# Patient Record
Sex: FEMALE | URBAN - METROPOLITAN AREA
[De-identification: names, ages, dates, MRNs, and addresses within clinical notes are randomized per-mention and may not be internally consistent; named-entity substitution may affect disease eponyms.]

---

## 2017-01-02 ENCOUNTER — TRANSFERRED RECORDS (OUTPATIENT)
Dept: HEALTH INFORMATION MANAGEMENT | Facility: CLINIC | Age: 37
End: 2017-01-02

## 2017-01-02 ENCOUNTER — MEDICAL CORRESPONDENCE (OUTPATIENT)
Dept: HEALTH INFORMATION MANAGEMENT | Facility: CLINIC | Age: 37
End: 2017-01-02

## 2017-01-04 DIAGNOSIS — O26.90 PREGNANCY RELATED CONDITION, UNSPECIFIED TRIMESTER: Primary | ICD-10-CM

## 2017-01-04 LAB
C TRACH DNA SPEC QL PROBE+SIG AMP: NEGATIVE
N GONORRHOEA DNA SPEC QL PROBE+SIG AMP: NEGATIVE

## 2017-01-30 ENCOUNTER — PRE VISIT (OUTPATIENT)
Dept: MATERNAL FETAL MEDICINE | Facility: CLINIC | Age: 37
End: 2017-01-30

## 2017-02-01 ENCOUNTER — OFFICE VISIT (OUTPATIENT)
Dept: INTERPRETER SERVICES | Facility: CLINIC | Age: 37
End: 2017-02-01

## 2017-02-01 ENCOUNTER — HOSPITAL ENCOUNTER (OUTPATIENT)
Dept: ULTRASOUND IMAGING | Facility: CLINIC | Age: 37
Discharge: HOME | End: 2017-02-01
Attending: NURSE PRACTITIONER | Admitting: OBSTETRICS & GYNECOLOGY
Payer: MEDICAID

## 2017-02-01 ENCOUNTER — OFFICE VISIT (OUTPATIENT)
Dept: MATERNAL FETAL MEDICINE | Facility: CLINIC | Age: 37
End: 2017-02-01
Attending: NURSE PRACTITIONER
Payer: MEDICAID

## 2017-02-01 DIAGNOSIS — O09.521 AMA (ADVANCED MATERNAL AGE) MULTIGRAVIDA 35+, FIRST TRIMESTER: ICD-10-CM

## 2017-02-01 DIAGNOSIS — O09.521 AMA (ADVANCED MATERNAL AGE) MULTIGRAVIDA 35+, FIRST TRIMESTER: Primary | ICD-10-CM

## 2017-02-01 DIAGNOSIS — Z36.82 NUCHAL TRANSLUCENCY OF FETUS ON PRENATAL ULTRASOUND: ICD-10-CM

## 2017-02-01 DIAGNOSIS — O26.90 PREGNANCY RELATED CONDITION, UNSPECIFIED TRIMESTER: ICD-10-CM

## 2017-02-01 PROCEDURE — 76801 OB US < 14 WKS SINGLE FETUS: CPT

## 2017-02-01 PROCEDURE — T1013 SIGN LANG/ORAL INTERPRETER: HCPCS | Mod: U3,ZF

## 2017-02-01 PROCEDURE — 76813 OB US NUCHAL MEAS 1 GEST: CPT

## 2017-02-01 PROCEDURE — 96040 ZZH GENETIC COUNSELING, EACH 30 MINUTES: CPT | Mod: ZF | Performed by: GENETIC COUNSELOR, MS

## 2017-02-01 PROCEDURE — 40000791 ZZHCL STATISTIC VERIFI PRENATAL TRISOMY 21,18,13: Performed by: OBSTETRICS & GYNECOLOGY

## 2017-02-01 PROCEDURE — 36415 COLL VENOUS BLD VENIPUNCTURE: CPT | Performed by: OBSTETRICS & GYNECOLOGY

## 2017-02-01 NOTE — PROGRESS NOTES
[facility]  Genetic Counseling Consult    Patient: Aniya Rowe YOB: 1980   Date of Service: 17      Aniya Rowe was seen with her  Willian and  Hazel from [place] at [facility] for genetic consultation to discuss the options for screening and testing for fetal chromosome abnormalities.  The indication for genetic counseling is advanced maternal age and previous pregnancy with triploidy.       Impression/Plan:     Aniya silver pregnancy history is significant for triploidy.  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  CVS revealed 69,XXX karyotype.  We reviewed the low recurrence risk due to triploidy.    1.  Aniya had an ultrasound and blood draw for NIPT (Innatal test through [employer]).  Results are expected within 7-10 days, and will be available in [employer].  We will contact her to discuss the results, and a copy will be forwarded to the office of the referring OB provider.    2.  Maternal serum AFP (single marker screen) is recommended after 15 weeks to screen for open neural tube defects. A quad screen should not be performed.    3.  An 18-20 week comprehensive ultrasound is standard of care for all women 35 or older at delivery.    Pregnancy History:   /Parity:    Age at Delivery: 36 year old  GARETT: 2017, by Last Menstrual Period  Gestational Age: 13w1d    No significant complications or exposures were reported in the current pregnancy.    Aniya silver pregnancy history is significant for triploidy.  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  CVS revealed 69,XXX karyotype.  We reviewed the low recurrence risk due to triploidy.    Medical History:   Aniya silver reported medical history is not expected to impact pregnancy management or risks to fetal development.       Family History:   A three-generation pedigree was updated from her  previous genetic counseling session in 2016 by myself and is scanned under the  Media  tab.   No updates were noted.  Otherwise, the reported family history is negative for multiple miscarriages, stillbirths, birth defects, mental retardation, known genetic conditions, and consanguinity.       Carrier Screening:   Patient and  are [place].     Risk Assessment for Chromosome Conditions:   We explained that the risk for fetal chromosome abnormalities increases with maternal age. We discussed specific features of common chromosome abnormalities, including Down syndrome, trisomy 13, trisomy 18, and sex chromosome trisomies.      - At age 36 at delivery, the risk to have a baby with Down syndrome is 1 in 227.    - At age 36 at delivery, the risk to have a baby with any chromosome abnormality is 1 in 129.          Testing Options:   We discussed the following options:   Non-invasive Prenatal Testing (NIPT)    Maternal plasma cell-free DNA testing; first trimester ultrasound with nuchal translucency and nasal bone assessment is recommended, when appropriate    Screens for fetal trisomy 21, trisomy 13, trisomy 18, and sex chromosome aneuploidy    Cannot screen for open neural tube defects; maternal serum AFP after 15 weeks is recommended     Chorionic villus sampling (CVS)    Invasive procedure typically performed in the first trimester by which placental villi are obtained for the purpose of chromosome analysis and/or other prenatal genetic analysis    Diagnostic results; >99% sensitivity for fetal chromosome abnormalities    Cannot test for open neural tube defects; maternal serum AFP after 15 weeks is recommended     Genetic Amniocentesis    Invasive procedure typically performed in the second trimester by which amniotic fluid is obtained for the purpose of chromosome analysis and/or other prenatal genetic analysis    Diagnostic results; >99% sensitivity for fetal chromosome abnormalities    AFAFP measurement  tests for open neural tube defects     Comprehensive (Level II) ultrasound: Detailed ultrasound performed between 18-22 weeks gestation to screen for major birth defects and markers for aneuploidy.        We reviewed the benefits and limitations of this testing.  Screening tests provide a risk assessment specific to the pregnancy for certain fetal chromosome abnormalities, but cannot definitively diagnose or exclude a fetal chromosome abnormality.  Follow-up genetic counseling and consideration of diagnostic testing is recommended with any abnormal screening result.     Diagnostic tests carry inherent risks- including risk of miscarriage- that require careful consideration.  These tests can detect fetal chromosome abnormalities with greater than 99% certainty.  Results can be compromised by maternal cell contamination or mosaicism, and are limited by the resolution of cytogenetic G-banding technology.  There is no screening nor diagnostic test that can detect all forms of birth defects or mental disability.     It was a pleasure to be involved with Aniya [facility]. Face-to-face time of the meeting was 30 minutes.    Gwen Sims [facility] [place]  Certified Genetic Counselor  Pager: [phone]

## 2017-02-02 NOTE — PROGRESS NOTES
Please refer to ultrasound report under 'Imaging' Studies of 'Chart Review' tabs.    Jerry Garcia M.D.

## 2017-02-09 ENCOUNTER — TELEPHONE (OUTPATIENT)
Dept: MATERNAL FETAL MEDICINE | Facility: CLINIC | Age: 37
End: 2017-02-09

## 2017-02-09 LAB — LAB SCANNED RESULT: NORMAL

## 2017-02-09 NOTE — TELEPHONE ENCOUNTER
February 9, 2017. Contacted Aniya DAVIS Rowe on the phone with  with normal Progenity/Innatal free fetal DNA screening for Down syndrome and trisomies 13 and 18 (see scanned report from [facility]).  Sex chromosome were also assessed and consistent with XY - gender was disclosed.  Discussed high detection rates for fetal Down syndrome, trisomies 13 and 18, and fetal sex chromosome abnormalities; however, not 100%.  Aniya indicated her understanding.  Results available in epic for review    Plan:  Routine follow up with primary Ob/Gyn.    Gwen Sism MS, [facility] [place]  [phone].

## 2017-02-11 LAB
ABO + RH BLD: NORMAL
ABO + RH BLD: NORMAL
BLD GP AB SCN SERPL QL: NORMAL
HBV SURFACE AG SERPL QL IA: NONREACTIVE
HIV 1+2 AB+HIV1 P24 AG SERPL QL IA: NONREACTIVE
RUBELLA ANTIBODY IGG QUANTITATIVE: 25.6 IU/ML

## 2017-03-13 ENCOUNTER — HOSPITAL ENCOUNTER (OUTPATIENT)
Dept: ULTRASOUND IMAGING | Facility: CLINIC | Age: 37
Discharge: HOME | End: 2017-03-13
Attending: OBSTETRICS & GYNECOLOGY | Admitting: OBSTETRICS & GYNECOLOGY
Payer: MEDICAID

## 2017-03-13 ENCOUNTER — OFFICE VISIT (OUTPATIENT)
Dept: MATERNAL FETAL MEDICINE | Facility: CLINIC | Age: 37
End: 2017-03-13
Attending: OBSTETRICS & GYNECOLOGY
Payer: MEDICAID

## 2017-03-13 DIAGNOSIS — O09.522 AMA (ADVANCED MATERNAL AGE) MULTIGRAVIDA 35+, SECOND TRIMESTER: Primary | ICD-10-CM

## 2017-03-13 DIAGNOSIS — O09.521 AMA (ADVANCED MATERNAL AGE) MULTIGRAVIDA 35+, FIRST TRIMESTER: ICD-10-CM

## 2017-03-13 PROCEDURE — 76811 OB US DETAILED SNGL FETUS: CPT

## 2017-03-13 NOTE — MR AVS SNAPSHOT
"              After Visit Summary   3/13/2017    Aniya Rowe    MRN: [id number]           Patient Information     Date Of Birth          1980        Visit Information        Provider Department      3/13/2017 2:45 PM Leah Hines,  [facility]        Today's Diagnoses     AMA (advanced maternal age) multigravida 35+, second trimester    -  1       Follow-ups after your visit        Who to contact     If you have questions or need follow up information about today's clinic visit or your schedule please contact [facility] directly at [phone].  Normal or non-critical lab and imaging results will be communicated to you by [employer], letter or phone within 4 business days after the clinic has received the results. If you do not hear from us within 7 days, please contact the clinic through [employer] or phone. If you have a critical or abnormal lab result, we will notify you by phone as soon as possible.  Submit refill requests through [email or web address] or call your pharmacy and they will forward the refill request to us. Please allow 3 business days for your refill to be completed.          Additional Information About Your Visit        MyChart Information     [email or web address] lets you send messages to your doctor, view your test results, renew your prescriptions, schedule appointments and more. To sign up, go to [email or web address] . Click on \"Log in\" on the left side of the screen, which will take you to the Welcome page. Then click on \"Sign up Now\" on the right side of the page.     You will be asked to enter the access code listed below, as well as some personal information. Please follow the directions to create your username and password.     Your access code is: [email or web address]  Expires: 2017  3:52 PM     Your access code will  in 90 days. If you need help or a new code, please call your [place] clinic or [phone].        Care " EveryWhere ID     This is your Care EveryWhere ID. This could be used by other organizations to access your [place] medical records  [id number]        Your Vitals Were     Last Period                   11/01/2016            Blood Pressure from Last 3 Encounters:   No data found for BP    Weight from Last 3 Encounters:   No data found for Wt              Today, you had the following     No orders found for display       Primary Care Provider    None       No address on file        Thank you!     Thank you for choosing [facility]  for your care. Our goal is always to provide you with excellent care. Hearing back from our patients is one way we can continue to improve our services. Please take a few minutes to complete the written survey that you may receive in the mail after your visit with us. Thank you!             Your Updated Medication List - Protect others around you: Learn how to safely use, store and throw away your medicines at www.disposemymeds.org.      Notice  As of 3/13/2017  3:19 PM    You have not been prescribed any medications.

## 2017-03-13 NOTE — PROGRESS NOTES
"Please see \"Imaging\" tab under \"Chart Review\" for details of today's US.    Leah Hines, DO    "

## 2017-04-21 ENCOUNTER — HOSPITAL ENCOUNTER (OUTPATIENT)
Facility: CLINIC | Age: 37
Discharge: HOME | End: 2017-04-21
Attending: ADVANCED PRACTICE MIDWIFE | Admitting: ADVANCED PRACTICE MIDWIFE
Payer: COMMERCIAL

## 2017-04-21 VITALS
HEART RATE: 73 BPM | OXYGEN SATURATION: 100 % | TEMPERATURE: 98.7 F | DIASTOLIC BLOOD PRESSURE: 66 MMHG | SYSTOLIC BLOOD PRESSURE: 101 MMHG

## 2017-04-21 PROCEDURE — 99213 OFFICE O/P EST LOW 20 MIN: CPT

## 2017-04-21 RX ORDER — PRENATAL VIT/IRON FUM/FOLIC AC 27MG-0.8MG
1 TABLET ORAL DAILY
COMMUNITY

## 2017-04-21 RX ORDER — ONDANSETRON 2 MG/ML
4 INJECTION INTRAMUSCULAR; INTRAVENOUS EVERY 6 HOURS PRN
Status: DISCONTINUED | OUTPATIENT
Start: 2017-04-21 | End: 2017-04-21 | Stop reason: HOSPADM

## 2017-04-21 NOTE — IP AVS SNAPSHOT
MRN:[id number]                      After Visit Summary   4/21/2017    Aniya Rowe    MRN: [id number]           Thank you!     Thank you for choosing [facility] for your care. Our goal is always to provide you with excellent care. Hearing back from our patients is one way we can continue to improve our services. Please take a few minutes to complete the written survey that you may receive in the mail after you visit with us. Thank you!        Patient Information     Date Of Birth          1980        Designated Caregiver       Most Recent Value    Caregiver    Will someone help with your care after discharge? no      About your hospital stay     You were admitted on:  April 21, 2017 You last received care in the:  [facility]    You were discharged on:  April 21, 2017       Who to Call     For medical emergencies, please call 911.  For non-urgent questions about your medical care, please call your primary care provider or clinic, None          Attending Provider     Provider Specialty    Awa Witt APRN CNM [facility]       Primary Care Provider    None       No address on file        Pending Results     No orders found from 4/19/2017 to 4/22/2017.            Statement of Approval     Ordered          04/21/17 1946  I have reviewed and agree with all the recommendations and orders detailed in this document.  EFFECTIVE NOW     Approved and electronically signed by:  Awa Witt APRN CNM             Admission Information     Date & Time Provider Department Dept. Phone    4/21/2017 Awa Witt APRN CNM [facility] [phone]      Your Vitals Were     Blood Pressure Pulse Temperature Last Period Pulse Oximetry       101/66 73 98.7  F (37.1  C) (Oral) 11/01/2016 100%       MyChart Information     [employer] lets you send messages to your doctor, view your test results, renew your prescriptions, schedule appointments and more. To sign up, go to [email or web address] . Click on  "\"Log in\" on the left side of the screen, which will take you to the Welcome page. Then click on \"Sign up Now\" on the right side of the page.     You will be asked to enter the access code listed below, as well as some personal information. Please follow the directions to create your username and password.     Your access code is: [email or web address]  Expires: 2017  3:52 PM     Your access code will  in 90 days. If you need help or a new code, please call your [place] clinic or [phone].        Care EveryWhere ID     This is your Care EveryWhere ID. This could be used by other organizations to access your [place] medical records  [id number]           Review of your medicines      UNREVIEWED medicines. Ask your doctor about these medicines        Dose / Directions    prenatal multivitamin  plus iron 27-0.8 MG Tabs per tablet   Indication:  Pregnancy        Dose:  1 tablet   Take 1 tablet by mouth daily   Refills:  0                Protect others around you: Learn how to safely use, store and throw away your medicines at www.disposemymeds.org.             Medication List: This is a list of all your medications and when to take them. Check marks below indicate your daily home schedule. Keep this list as a reference.      Medications           Morning Afternoon Evening Bedtime As Needed    prenatal multivitamin  plus iron 27-0.8 MG Tabs per tablet   Take 1 tablet by mouth daily                                          More Information        Recuento de patadas  Es normal que le preocupe la sujata de anderson bebé. Para saber si el bebé está katherine, usted puede anotar las veces que usted siente bladimir pataditas en un registro de movimientos todos los días. Normalmente es posible sentir que el bebé se mueve a partir de la semana 20 del embarazo. No olvide llevar los registros de los movimientos del bebé a todas las citas que tenga con anderson proveedor de atención médica.     Cómo contar los movimientos    Escoja paul hora " cuando el bebé esté activo, heather por ejemplo después de paul comida.    Siéntese cómodamente o acuéstese de lado.    La primera vez que el bebé se mueva, anote la hora.    Cuente cada movimiento hasta que el bebé se haya movido 10 veces. ([place] puede llevar entre 20 minutos y 2 horas.)    Si el bebé no se ha movido 4 veces en 1 hora, dése paul palmadita en el abdomen para despertarlo.    Anote la hora en que sienta el décimo movimiento del bebé.    Trate de hacer esto a la misma hora todos los días.  Cuándo debe llamar al proveedor de atención médica  Llame a anderson proveedor de atención médica en el acto en cualquiera de los siguientes casos:     Si el bebé se mueve menos de 10 veces en 1 horas mientras usted está llevando la cuenta de las pataditas.    Si el bebé se mueve con mucha menos frecuencia que en días anteriores.    Si usted no ha sentido movimientos del bebé en todo el día.    [id number] [address] [address]. Todos los derechos reservados. Esta información no pretende sustituir la atención médica profesional. Sólo anderson médico puede diagnosticar y tratar un problema de sujata.            Falso trabajo de parto (False Labor)  Si anderson embarazo está en la semana 37 o más y usted tiene contracciones que no son las de un verdadero trabajo de parto, usted está teniendo un falso trabajo de parto. [place] significa que aún no es el momento de byron a colten a anderson bebé.    Las verdaderas contracciones del trabajo de parto pueden comenzar de forma irregular, laurie rápidamente se vuelven más seguidas y regulares, haciéndose más justin y prolongadas. Los intervalos entre las contracciones se van haciendo más cortos. Aun desde el comienzo, estas contracciones valladares al menos 30 segundos y aumentan hasta durar un minuto. El verdadero trabajo de parto suele comenzar en la parte de atrás y pasarse a la parte de adelante.  Las contracciones del falso trabajo de parto pueden ser justin, frecuentes y  dolorosas laurie no son regulares. La intensidad puede variar desde chace a leve y nuevamente a chace. Las contracciones del falso trabajo de parto suelen sentirse en la parte de adelante. Mientras que las verdaderas contracciones del trabajo de parto no desaparecen independientemente de lo que usted esté haciendo, el falso trabajo de parto puede interrumpirse espontáneamente o cuando usted descansa o se mueve de un lugar a otro.  El falso trabajo de parto puede hacer que usted se sienta ansiosa o pierda el sueño. Tener un falso trabajo de parto no significa que usted esté enferma o que le suceda algo ashleigh a anderson debra. No necesita brinda ningún medicamento para ello.  Algunas veces, puede ser difícil diferenciar un verdadero trabajo de parto a jameel falso. En tales casos, usted podría necesitar un examen vaginal, el cual le permite al proveedor de atención médica revisar si hay cambios en el brit uterino que solo ocurren en un trabajo de parto verdadero.  Cuidados en el hogar    Puede ayudar beber abundante agua y brinda un baño de agua tibia. Berenice lo que pueda con anticipación para prepararse para el momento de byron a colten, de manera que tenga menos preocupaciones más tarde.    Lleve un registro de bladimir contracciones. Anote la hora a la que empieza cada contracción y el tiempo que dura. Un cronómetro puede ser de utilidad. Observe el patrón que siguen  las contracciones espaciadas regularmente y el aumento gradual de tiempo que dura cada paul.    No se avergüence de ir al hospital con paul  falsa alarma . Considérelo heather paul buena práctica para cuando llegue el momento de verdad.  Cuidados de seguimiento  Berenice un seguimiento con anderson proveedor de atención médica o heather le indiquen. Si está preocupada, confundida, no puede comer o dormir, o tiene preguntas sobre anderson sujata o anderson embarazo, programe paul jasmeet con anderson proveedor.  Cuándo bucar consejo médico  Llame a anderson proveedor de atención médica si algo de lo siguiente  ocurre:    Si tiene menos de 37 semanas y comienza a tener contracciones nuevamente.    Las contracciones son regulares, se tornan más prolongadas, más justin y más seguidas.    Se rompe la oswaldo.    Tiene sangrado vaginal.    Siente paul disminución en los movimientos del bebé o cualquier otro cambio inusual.    No está le si está teniendo un trabajo de parto falso o verdadero.    [id number] [employer]. [address] [place] [address], [place] [address]. Todos los derechos reservados. Esta información no pretende sustituir la atención médica profesional. Sólo anderson médico puede diagnosticar y tratar un problema de sujata.

## 2017-04-21 NOTE — IP AVS SNAPSHOT
[facility]    [address]    [place] [address]    Phone:  [phone]                                       After Visit Summary   4/21/2017    Aniya Rowe    MRN: [id number]           After Visit Summary Signature Page     I have received my discharge instructions, and my questions have been answered. I have discussed any challenges I see with this plan with the nurse or doctor.    ..........................................................................................................................................  Patient/Patient Representative Signature      ..........................................................................................................................................  Patient Representative Print Name and Relationship to Patient    ..................................................               ................................................  Date                                            Time    ..........................................................................................................................................  Reviewed by Signature/Title    ...................................................              ..............................................  Date                                                            Time

## 2017-04-22 NOTE — PLAN OF CARE
Data: [facility] at 1834.   Reason for maternal/fetal assessment per patient is Abdominal Pain  . Patient is a . Prenatal record reviewed.      Obstetric History       T4      TAB1   SAB0   E0   M0   L4       # Outcome Date GA Lbr Lorenzo/2nd Weight Sex Delivery Anes PTL Lv   6 Current            5 TAB 16 12w4d    TAB      4 Term 04 40w0d   F    Y   3 Term 01 40w0d   M    Y   2 Term 10/23/98 40w0d   M    Y   1 Term 96 40w0d   M    Y         Medical History:   Past Medical History:   Diagnosis Date     Anemia    . Gestational Age 24w3d. VSS. Cervix: not examined.  Fetal movement present. Patient denies cramping, backache, vaginal discharge, pelvic pressure, UTI symptoms, GI problems, bloody show, vaginal bleeding, edema, headache, visual disturbances, epigastric or URQ pain, abdominal pain, rupture of membranes. Support persons Willian present.  Action: Verbal consent for EFM. Triage assessment completed. EFM applied upon arrival. Uterine assessment showed no contractions, none felt by patient. Fetal assessment: Presumed adequate fetal oxygenation documented (see flow record). Patient education pamphlets given on fetal movement counts and  labor. Patient instructed to report change in fetal movement, vaginal leaking of fluid or bleeding, abdominal pain, or any concerns related to the pregnancy to her nurse/physician.   Response: Awa Witt CNM informed of patients arrival. Plan per provider is to discharge home. Patient verbalized understanding of education and verbalized agreement with plan. Discharged ambulatory at .

## 2017-04-22 NOTE — PROGRESS NOTES
CHIEF COMPLAINT  ========================  Aniya Rowe is a 36 year old patient presenting today at 24w3d for evaluation of intermittant pelvic pressure when ambulating.     Patient's last menstrual period was 2016.  Estimated Date of Delivery: Aug 8, 2017     HPI  ==================   Patient reports that this week she has noticed increased lower abdominal pressure and occasional pain in the vagina with this pressure.  Denies contractions, states that pressure is relieved with rest.  Feels anxious about this pressure and concern for possible fetal loss due to her past complicated pregnancy.  She states that she is unable to finish her work with ease this week due to new onset of pelvic pressure.  She denies any change in vaginal discharge, denies urinary symptoms, denies N&V or change in bowel habits.    CONTRACTIONS: none  ABDOMINAL PAIN: pressure and modifying factors rest improves symptoms.    FETAL MOVEMENT: active  VAGINAL BLEEDING: none    REVIEW OF SYSTEMS  =====================  C: NEGATIVE for fever, chills  I: NEGATIVE for worrisome rashes, moles or lesions  E: NEGATIVE for vision changes or irritation  R: NEGATIVE for significant cough or SOB  CV: NEGATIVE for chest pain, palpitations or varicosities  GI: NEGATIVE for nausea, abdominal pain, heartburn, or change in bowel habits  : NEGATIVE for frequency, dysuria, or hematuria  M: NEGATIVE for significant arthralgias or myalgia  N: NEGATIVE for headache, weakness, dizziness or paresthesias  P: NEGATIVE for changes in mood or affect  HISTORIES  ==============  ALLERGIES:  No Known Allergies  PAST MEDICAL HISTORY  Past Medical History:   Diagnosis Date     Anemia      PARTNER: Willian at bedside  FAMILY HISTORY  No family history on file.  OB HISTORY  Prenatal record reviewed from care everywhere.    at term x4, MAB at 14 weeks with chromosomal abnormality.  EXAM  ============  Vital signs stable, afebrile and BP is WNL  GENERAL  APPEARANCE: healthy, alert and no distress  RESP: lungs clear to auscultation - no rales, rhonchi or wheezes  BREAST: normal without masses, tenderness or nipple discharge and no palpable axillary masses or adenopathy  CV: regular rates and rhythm, normal S1 S2, no S3 or S4 and no murmur,and no varicosities  ABDOMEN:  soft, nontender,non-tender between contractions no epigastric pain  NEURO: Denies headache, blurred vision  PSYCH: mentation appears normal. and affect normal/bright  LEGS: No edema  CONTRACTIONS: None per EFM or felt per pateitn.    FETAL HEART TONES:  140 with moderate long term variability, accelerations-yes, decels none.  NST: appropriate for gestational age    PELVIC EXAM: not indicated    LABS:  None    DIAGNOSIS  ============  24w3d, Pelvic pressure appropriate for gestational age and parity Reassuring fetal status  PLAN  ============  Discharge to home  Patient reports she has a support belt at home, has not been using it.  Encouraged patient to use it when working  Follow up in clinic this week  Discussed normal uterine/fetal growth at this gestation and what to expect.  Reviewed warning signs of PTL and when to return  Patient verbalized understanding agrees with plan to discharge to home.  RANI Brody CNM, CNM

## 2017-07-31 ENCOUNTER — HOSPITAL ENCOUNTER (OUTPATIENT)
Dept: ULTRASOUND IMAGING | Facility: CLINIC | Age: 37
Discharge: HOME | End: 2017-07-31
Attending: NURSE PRACTITIONER | Admitting: OBSTETRICS & GYNECOLOGY
Payer: COMMERCIAL

## 2017-07-31 ENCOUNTER — OFFICE VISIT (OUTPATIENT)
Dept: MATERNAL FETAL MEDICINE | Facility: CLINIC | Age: 37
End: 2017-07-31
Attending: NURSE PRACTITIONER
Payer: COMMERCIAL

## 2017-07-31 DIAGNOSIS — O28.8 AMNIOTIC FLUID INDEX BORDERLINE LOW: Primary | ICD-10-CM

## 2017-07-31 DIAGNOSIS — O26.90 PREGNANCY RELATED CONDITION, UNSPECIFIED TRIMESTER: ICD-10-CM

## 2017-07-31 PROCEDURE — 76816 OB US FOLLOW-UP PER FETUS: CPT

## 2017-07-31 NOTE — MR AVS SNAPSHOT
"              After Visit Summary   2017    Aniya Rowe    MRN: [id number]           Patient Information     Date Of Birth          1980        Visit Information        Provider Department      2017 10:45 AM Leah Hines, DO [facility] Maternal Fetal Medicine [facility]        Today's Diagnoses     Amniotic fluid index borderline low    -  1       Follow-ups after your visit        Who to contact     If you have questions or need follow up information about today's clinic visit or your schedule please contact [facility] MATERNAL FETAL MEDICINE [facility] directly at [phone].  Normal or non-critical lab and imaging results will be communicated to you by [employer], letter or phone within 4 business days after the clinic has received the results. If you do not hear from us within 7 days, please contact the clinic through Afflet or phone. If you have a critical or abnormal lab result, we will notify you by phone as soon as possible.  Submit refill requests through atHomestars or call your pharmacy and they will forward the refill request to us. Please allow 3 business days for your refill to be completed.          Additional Information About Your Visit        MyChart Information     atHomestars lets you send messages to your doctor, view your test results, renew your prescriptions, schedule appointments and more. To sign up, go to [email or web address] . Click on \"Log in\" on the left side of the screen, which will take you to the Welcome page. Then click on \"Sign up Now\" on the right side of the page.     You will be asked to enter the access code listed below, as well as some personal information. Please follow the directions to create your username and password.     Your access code is: [email or web address]  Expires: 10/29/2017 11:04 AM     Your access code will  in 90 days. If you need help or a new code, please call your [place] clinic or [phone].        Care EveryWhere ID     This is " your Care EveryWhere ID. This could be used by other organizations to access your Mobile medical records  [id number]        Your Vitals Were     Last Period                   11/01/2016            Blood Pressure from Last 3 Encounters:   04/21/17 101/66    Weight from Last 3 Encounters:   No data found for Wt              Today, you had the following     No orders found for display       Primary Care Provider    None       No address on file        Equal Access to Services     [facility]: Hadii aad ku hadasho Soomaali, waaxda luqadaha, qaybta kaalmada adeegyada, waxay staciain hayaan adeeg art lakevenjamar . So [facility] [place] [phone].    ATENCIÓN: Si habla español, tiene a anderson disposición servicios gratuitos de asistencia lingüística. Llame al [phone].    We comply with applicable federal civil rights laws and [place] laws. We do not discriminate on the basis of race, color, national origin, age, disability sex, sexual orientation or gender identity.            Thank you!     Thank you for choosing [facility]  for your care. Our goal is always to provide you with excellent care. Hearing back from our patients is one way we can continue to improve our services. Please take a few minutes to complete the written survey that you may receive in the mail after your visit with us. Thank you!             Your Updated Medication List - Protect others around you: Learn how to safely use, store and throw away your medicines at www.disposemymeds.org.          This list is accurate as of: 7/31/17 11:04 AM.  Always use your most recent med list.                   Brand Name Dispense Instructions for use Diagnosis    prenatal multivitamin  plus iron 27-0.8 MG Tabs per tablet      Take 1 tablet by mouth daily

## 2017-08-11 ENCOUNTER — HOSPITAL ENCOUNTER (OUTPATIENT)
Dept: ULTRASOUND IMAGING | Facility: CLINIC | Age: 37
Discharge: HOME | End: 2017-08-11
Attending: ADVANCED PRACTICE MIDWIFE | Admitting: OBSTETRICS & GYNECOLOGY
Payer: COMMERCIAL

## 2017-08-11 ENCOUNTER — OFFICE VISIT (OUTPATIENT)
Dept: MATERNAL FETAL MEDICINE | Facility: CLINIC | Age: 37
End: 2017-08-11
Attending: ADVANCED PRACTICE MIDWIFE
Payer: COMMERCIAL

## 2017-08-11 DIAGNOSIS — O26.90 PREGNANCY RELATED CONDITION, UNSPECIFIED TRIMESTER: ICD-10-CM

## 2017-08-11 DIAGNOSIS — O28.8 AMNIOTIC FLUID INDEX BORDERLINE LOW: Primary | ICD-10-CM

## 2017-08-11 PROCEDURE — 76819 FETAL BIOPHYS PROFIL W/O NST: CPT

## 2017-08-11 NOTE — MR AVS SNAPSHOT
"              After Visit Summary   8/11/2017    Aniya Rowe    MRN: [id number]           Patient Information     Date Of Birth          1980        Visit Information        Provider Department      8/11/2017 4:15 PM Laz Watson MD [facility] Maternal Fetal Medicine [facility]        Today's Diagnoses     Amniotic fluid index borderline low    -  1       Follow-ups after your visit        Future tests that were ordered for you today     Open Future Orders        Priority Expected Expires Ordered    MFM BPP Single ASAP  6/10/2018 8/10/2017            Who to contact     If you have questions or need follow up information about today's clinic visit or your schedule please contact [facility] MATERNAL FETAL MEDICINE [facility] directly at [phone].  Normal or non-critical lab and imaging results will be communicated to you by Managed by Qhart, letter or phone within 4 business days after the clinic has received the results. If you do not hear from us within 7 days, please contact the clinic through sonarDesignt or phone. If you have a critical or abnormal lab result, we will notify you by phone as soon as possible.  Submit refill requests through Pixc or call your pharmacy and they will forward the refill request to us. Please allow 3 business days for your refill to be completed.          Additional Information About Your Visit        Managed by Qhart Information     Pixc lets you send messages to your doctor, view your test results, renew your prescriptions, schedule appointments and more. To sign up, go to [email or web address] . Click on \"Log in\" on the left side of the screen, which will take you to the Welcome page. Then click on \"Sign up Now\" on the right side of the page.     You will be asked to enter the access code listed below, as well as some personal information. Please follow the directions to create your username and password.     Your access code is: [email or web address]  Expires: 10/29/2017 11:04 AM     Your " access code will  in 90 days. If you need help or a new code, please call your [place] clinic or [phone].        Care EveryWhere ID     This is your Care EveryWhere ID. This could be used by other organizations to access your [place] medical records  [id number]        Your Vitals Were     Last Period                   2016            Blood Pressure from Last 3 Encounters:   17 101/66    Weight from Last 3 Encounters:   No data found for Wt              Today, you had the following     No orders found for display       Primary Care Provider    None       No address on file        Equal Access to Services     [facility]: Hadii aad ku hadasho Soomaali, waaxda luqadaha, qaybta kaalmada adeegyamarcelo, adam stewart . So [facility] [phone].    ATENCIÓN: Si habla español, tiene a anderson disposición servicios gratuitos de asistencia lingüística. Llame al [phone].    We comply with applicable federal civil rights laws and [place] laws. We do not discriminate on the basis of race, color, national origin, age, disability sex, sexual orientation or gender identity.            Thank you!     Thank you for choosing [facility]  for your care. Our goal is always to provide you with excellent care. Hearing back from our patients is one way we can continue to improve our services. Please take a few minutes to complete the written survey that you may receive in the mail after your visit with us. Thank you!             Your Updated Medication List - Protect others around you: Learn how to safely use, store and throw away your medicines at www.disposemymeds.org.          This list is accurate as of: 17  5:55 PM.  Always use your most recent med list.                   Brand Name Dispense Instructions for use Diagnosis    prenatal multivitamin plus iron 27-0.8 MG Tabs per tablet      Take 1 tablet by mouth daily

## 2017-08-11 NOTE — PROGRESS NOTES
"Please see \"Imaging\" tab under \"Chart Review\" for details of today's US at the [facility].    Laz Watson MD  Maternal-Fetal Medicine      "

## 2017-08-12 ENCOUNTER — HOSPITAL ENCOUNTER (INPATIENT)
Facility: CLINIC | Age: 37
LOS: 2 days | Discharge: HOME | End: 2017-08-14
Attending: FAMILY MEDICINE | Admitting: FAMILY MEDICINE
Payer: COMMERCIAL

## 2017-08-12 PROBLEM — Z37.9 NORMAL LABOR: Status: ACTIVE | Noted: 2017-08-12

## 2017-08-12 LAB
ABO + RH BLD: NORMAL
ABO + RH BLD: NORMAL
SPECIMEN EXP DATE BLD: NORMAL
T PALLIDUM IGG+IGM SER QL: NEGATIVE

## 2017-08-12 PROCEDURE — 99215 OFFICE O/P EST HI 40 MIN: CPT

## 2017-08-12 PROCEDURE — 12000032 ZZH R&B OB CRITICAL UMMC

## 2017-08-12 PROCEDURE — 25000125 ZZHC RX 250: Performed by: FAMILY MEDICINE

## 2017-08-12 PROCEDURE — 10907ZC DRAINAGE OF AMNIOTIC FLUID, THERAPEUTIC FROM PRODUCTS OF CONCEPTION, VIA NATURAL OR ARTIFICIAL OPENING: ICD-10-PCS | Performed by: ADVANCED PRACTICE MIDWIFE

## 2017-08-12 PROCEDURE — 86900 BLOOD TYPING SEROLOGIC ABO: CPT | Performed by: FAMILY MEDICINE

## 2017-08-12 PROCEDURE — 86900 BLOOD TYPING SEROLOGIC ABO: CPT | Performed by: ADVANCED PRACTICE MIDWIFE

## 2017-08-12 PROCEDURE — 25000128 H RX IP 250 OP 636: Performed by: FAMILY MEDICINE

## 2017-08-12 PROCEDURE — 25000132 ZZH RX MED GY IP 250 OP 250 PS 637: Performed by: ADVANCED PRACTICE MIDWIFE

## 2017-08-12 PROCEDURE — 86780 TREPONEMA PALLIDUM: CPT | Performed by: FAMILY MEDICINE

## 2017-08-12 PROCEDURE — 72200001 ZZH LABOR CARE VAGINAL DELIVERY SINGLE

## 2017-08-12 PROCEDURE — 86901 BLOOD TYPING SEROLOGIC RH(D): CPT | Performed by: FAMILY MEDICINE

## 2017-08-12 RX ORDER — OXYTOCIN 10 [USP'U]/ML
10 INJECTION, SOLUTION INTRAMUSCULAR; INTRAVENOUS
Status: DISCONTINUED | OUTPATIENT
Start: 2017-08-12 | End: 2017-08-12

## 2017-08-12 RX ORDER — NALOXONE HYDROCHLORIDE 0.4 MG/ML
.1-.4 INJECTION, SOLUTION INTRAMUSCULAR; INTRAVENOUS; SUBCUTANEOUS
Status: DISCONTINUED | OUTPATIENT
Start: 2017-08-12 | End: 2017-08-14 | Stop reason: HOSPADM

## 2017-08-12 RX ORDER — OXYTOCIN/0.9 % SODIUM CHLORIDE 30/500 ML
100-340 PLASTIC BAG, INJECTION (ML) INTRAVENOUS CONTINUOUS PRN
Status: COMPLETED | OUTPATIENT
Start: 2017-08-12 | End: 2017-08-12

## 2017-08-12 RX ORDER — NALOXONE HYDROCHLORIDE 0.4 MG/ML
.1-.4 INJECTION, SOLUTION INTRAMUSCULAR; INTRAVENOUS; SUBCUTANEOUS
Status: DISCONTINUED | OUTPATIENT
Start: 2017-08-12 | End: 2017-08-12

## 2017-08-12 RX ORDER — OXYTOCIN 10 [USP'U]/ML
INJECTION, SOLUTION INTRAMUSCULAR; INTRAVENOUS
Status: DISCONTINUED
Start: 2017-08-12 | End: 2017-08-12 | Stop reason: WASHOUT

## 2017-08-12 RX ORDER — BISACODYL 10 MG
10 SUPPOSITORY, RECTAL RECTAL DAILY PRN
Status: DISCONTINUED | OUTPATIENT
Start: 2017-08-14 | End: 2017-08-14 | Stop reason: HOSPADM

## 2017-08-12 RX ORDER — IBUPROFEN 800 MG/1
800 TABLET, FILM COATED ORAL
Status: DISCONTINUED | OUTPATIENT
Start: 2017-08-12 | End: 2017-08-12

## 2017-08-12 RX ORDER — SODIUM CHLORIDE, SODIUM LACTATE, POTASSIUM CHLORIDE, CALCIUM CHLORIDE 600; 310; 30; 20 MG/100ML; MG/100ML; MG/100ML; MG/100ML
INJECTION, SOLUTION INTRAVENOUS CONTINUOUS
Status: DISCONTINUED | OUTPATIENT
Start: 2017-08-12 | End: 2017-08-12

## 2017-08-12 RX ORDER — METHYLERGONOVINE MALEATE 0.2 MG/ML
200 INJECTION INTRAVENOUS
Status: DISCONTINUED | OUTPATIENT
Start: 2017-08-12 | End: 2017-08-12

## 2017-08-12 RX ORDER — IBUPROFEN 400 MG/1
400-800 TABLET, FILM COATED ORAL EVERY 6 HOURS PRN
Status: DISCONTINUED | OUTPATIENT
Start: 2017-08-12 | End: 2017-08-14 | Stop reason: HOSPADM

## 2017-08-12 RX ORDER — OXYCODONE HYDROCHLORIDE 5 MG/1
5-10 TABLET ORAL
Status: DISCONTINUED | OUTPATIENT
Start: 2017-08-12 | End: 2017-08-14 | Stop reason: HOSPADM

## 2017-08-12 RX ORDER — OXYCODONE AND ACETAMINOPHEN 5; 325 MG/1; MG/1
1 TABLET ORAL
Status: DISCONTINUED | OUTPATIENT
Start: 2017-08-12 | End: 2017-08-12

## 2017-08-12 RX ORDER — LANOLIN 100 %
OINTMENT (GRAM) TOPICAL
Status: DISCONTINUED | OUTPATIENT
Start: 2017-08-12 | End: 2017-08-14 | Stop reason: HOSPADM

## 2017-08-12 RX ORDER — OXYTOCIN 10 [USP'U]/ML
10 INJECTION, SOLUTION INTRAMUSCULAR; INTRAVENOUS
Status: DISCONTINUED | OUTPATIENT
Start: 2017-08-12 | End: 2017-08-14 | Stop reason: HOSPADM

## 2017-08-12 RX ORDER — PENICILLIN G POTASSIUM 5000000 [IU]/1
5 INJECTION, POWDER, FOR SOLUTION INTRAMUSCULAR; INTRAVENOUS ONCE
Status: COMPLETED | OUTPATIENT
Start: 2017-08-12 | End: 2017-08-12

## 2017-08-12 RX ORDER — OXYTOCIN/0.9 % SODIUM CHLORIDE 30/500 ML
PLASTIC BAG, INJECTION (ML) INTRAVENOUS
Status: DISCONTINUED
Start: 2017-08-12 | End: 2017-08-12 | Stop reason: HOSPADM

## 2017-08-12 RX ORDER — ACETAMINOPHEN 325 MG/1
650 TABLET ORAL EVERY 4 HOURS PRN
Status: DISCONTINUED | OUTPATIENT
Start: 2017-08-12 | End: 2017-08-12

## 2017-08-12 RX ORDER — ONDANSETRON 2 MG/ML
4 INJECTION INTRAMUSCULAR; INTRAVENOUS EVERY 6 HOURS PRN
Status: DISCONTINUED | OUTPATIENT
Start: 2017-08-12 | End: 2017-08-12

## 2017-08-12 RX ORDER — ACETAMINOPHEN 325 MG/1
650 TABLET ORAL EVERY 4 HOURS PRN
Status: DISCONTINUED | OUTPATIENT
Start: 2017-08-12 | End: 2017-08-14 | Stop reason: HOSPADM

## 2017-08-12 RX ORDER — MISOPROSTOL 200 UG/1
400 TABLET ORAL
Status: DISCONTINUED | OUTPATIENT
Start: 2017-08-12 | End: 2017-08-14 | Stop reason: HOSPADM

## 2017-08-12 RX ORDER — AMOXICILLIN 250 MG
1-2 CAPSULE ORAL 2 TIMES DAILY
Status: DISCONTINUED | OUTPATIENT
Start: 2017-08-12 | End: 2017-08-14 | Stop reason: HOSPADM

## 2017-08-12 RX ORDER — CARBOPROST TROMETHAMINE 250 UG/ML
250 INJECTION, SOLUTION INTRAMUSCULAR
Status: DISCONTINUED | OUTPATIENT
Start: 2017-08-12 | End: 2017-08-12

## 2017-08-12 RX ORDER — FENTANYL CITRATE 50 UG/ML
50-100 INJECTION, SOLUTION INTRAMUSCULAR; INTRAVENOUS
Status: DISCONTINUED | OUTPATIENT
Start: 2017-08-12 | End: 2017-08-12

## 2017-08-12 RX ORDER — MISOPROSTOL 200 UG/1
TABLET ORAL
Status: DISCONTINUED
Start: 2017-08-12 | End: 2017-08-12 | Stop reason: WASHOUT

## 2017-08-12 RX ORDER — OXYTOCIN/0.9 % SODIUM CHLORIDE 30/500 ML
100 PLASTIC BAG, INJECTION (ML) INTRAVENOUS CONTINUOUS
Status: DISCONTINUED | OUTPATIENT
Start: 2017-08-12 | End: 2017-08-14 | Stop reason: HOSPADM

## 2017-08-12 RX ORDER — OXYTOCIN/0.9 % SODIUM CHLORIDE 30/500 ML
340 PLASTIC BAG, INJECTION (ML) INTRAVENOUS CONTINUOUS PRN
Status: DISCONTINUED | OUTPATIENT
Start: 2017-08-12 | End: 2017-08-14 | Stop reason: HOSPADM

## 2017-08-12 RX ORDER — LIDOCAINE HYDROCHLORIDE 10 MG/ML
INJECTION, SOLUTION EPIDURAL; INFILTRATION; INTRACAUDAL; PERINEURAL
Status: DISCONTINUED
Start: 2017-08-12 | End: 2017-08-12 | Stop reason: WASHOUT

## 2017-08-12 RX ORDER — HYDROCORTISONE 2.5 %
CREAM (GRAM) TOPICAL 3 TIMES DAILY PRN
Status: DISCONTINUED | OUTPATIENT
Start: 2017-08-12 | End: 2017-08-14 | Stop reason: HOSPADM

## 2017-08-12 RX ADMIN — SODIUM CHLORIDE, POTASSIUM CHLORIDE, SODIUM LACTATE AND CALCIUM CHLORIDE: 600; 310; 30; 20 INJECTION, SOLUTION INTRAVENOUS at 18:39

## 2017-08-12 RX ADMIN — PENICILLIN G POTASSIUM 5 MILLION UNITS: 5000000 POWDER, FOR SOLUTION INTRAMUSCULAR; INTRAPLEURAL; INTRATHECAL; INTRAVENOUS at 09:14

## 2017-08-12 RX ADMIN — Medication 2.5 MILLION UNITS: at 13:06

## 2017-08-12 RX ADMIN — Medication 2.5 MILLION UNITS: at 17:14

## 2017-08-12 RX ADMIN — IBUPROFEN 800 MG: 400 TABLET ORAL at 20:51

## 2017-08-12 RX ADMIN — OXYTOCIN-SODIUM CHLORIDE 0.9% IV SOLN 30 UNIT/500ML 340 ML/HR: 30-0.9/5 SOLUTION at 19:30

## 2017-08-12 RX ADMIN — SODIUM CHLORIDE, POTASSIUM CHLORIDE, SODIUM LACTATE AND CALCIUM CHLORIDE: 600; 310; 30; 20 INJECTION, SOLUTION INTRAVENOUS at 09:13

## 2017-08-12 NOTE — PROGRESS NOTES
"Blood pressure 118/72, pulse 90, temperature 98.6  F (37  C), temperature source Oral, resp. rate 16, height 1.499 m (4' 11\"), weight 68 kg (150 lb), last menstrual period 2016, unknown if currently breastfeeding.  General appearance: uncomfortable with contractions  Using N2O effectively. Position changes    CONTACTIONS: Contractions every 2-4 minutes.  Palpate: strong  Pitocin- none,  Antibiotics- PCN  FETAL HEART TONES: baseline 150 with moderate FHR variability and  pos accelerations.  No decelerations present.    ROM: not ruptured  PELVIC EXAM:PELVIC EXAM: 8/ 95%/ Anterior/ soft/ -1    AROM for clear fluid    ASSESSMENT:  ==============  IUP @ 40w4d active labor and good progress   Fetal Heart rate tracing Category category one  GBS- positive       PLAN:  ===========  comfort measures prn encourage position changes  Anticipate        Tata Tilley,JAVADM, APRN      "

## 2017-08-12 NOTE — IP AVS SNAPSHOT
[facility]    [address] [address]    Phone:  [phone]                                       After Visit Summary   8/12/2017    Aniya Rowe    MRN: [id number]           After Visit Summary Signature Page     I have received my discharge instructions, and my questions have been answered. I have discussed any challenges I see with this plan with the nurse or doctor.    ..........................................................................................................................................  Patient/Patient Representative Signature      ..........................................................................................................................................  Patient Representative Print Name and Relationship to Patient    ..................................................               ................................................  Date                                            Time    ..........................................................................................................................................  Reviewed by Signature/Title    ...................................................              ..............................................  Date                                                            Time

## 2017-08-12 NOTE — PROGRESS NOTES
"Blood pressure 132/75, pulse 55, temperature 98.8  F (37.1  C), temperature source Oral, resp. rate 16, height 1.499 m (4' 11\"), weight 68 kg (150 lb), last menstrual period 2016, unknown if currently breastfeeding.  General appearance: uncomfortable with contractions  Breathing through contx. In bed, has not been walking    CONTACTIONS: Contractions every 3-5 minutes.  Palpate: moderate  Pitocin- none,  Antibiotics- none  FETAL HEART TONES: baseline 140 with moderate FHR variability and  pos accelerations.  No decelerations present.    ROM: not ruptured  PELVIC EXAM:PELVIC EXAM: 4/ 80%/ Mid/ average/ -3 , bloody show    ASSESSMENT:  ==============  IUP @ 40w4d early labor   Fetal Heart rate tracing Category category one  GBS- positive       PLAN:  ===========  comfort measures prn , encourage upright, mobility. Wants to walk, will consider IA   Prophylactic antibiotic for + GBS status, will start anbx now  Anticipate   reevaluate in 2-4 hours/PRN     Tata Tilley CNM, APRN      "

## 2017-08-12 NOTE — PLAN OF CARE
Data: Patient presented to [facility] at 0524.   Reason for maternal/fetal assessment per patient is laboring, ctx started at midnight.  Patient is a . Prenatal record reviewed.      Obstetric History       T4      L4     SAB1   TAB0   Ectopic0   Multiple0   Live Births4       # Outcome Date GA Lbr Lorenzo/2nd Weight Sex Delivery Anes PTL Lv   6 Current            5 TAB 16 12w4d    TAB      4 Term 04 40w0d   F    NIKKI   3 Term 01 40w0d   M    NIKKI   2 Term 10/23/98 40w0d   M    NIKKI   1 Term 96 40w0d   M    NIKKI      . Medical history:   Past Medical History:   Diagnosis Date     Anemia    . Gestational Age 40w4d. VSS. Fetal movement present. Patient denies backache, vaginal discharge, UTI symptoms, GI problems, vaginal bleeding, edema, headache, visual disturbances, epigastric or URQ pain, abdominal pain, rupture of membranes. Reports contractions started at midnight, small amount of bloody show, and pelvic pressure.  Support persons  present.  Action: Verbal consent for EFM. Triage assessment completed.  Fetal assessment: Presumed adequate fetal oxygenation documented (see flow record).   Response: Dr. Zen Kerr informed of arrival, cervix per MD /-1. Plan per provider is admit. Patient verbalized agreement with plan. Patient transferred to room 471 ambulatory, oriented to room and call light.

## 2017-08-12 NOTE — H&P
[facility]  Ob Admit /Triage Note    Aniya Rowe MRN# [id number]   Age: 36 year old YOB: 1980     Date of Admission: 2017 6:07 AM  Date of service: 2017.       History of Present Illness (Resident / Clinician):        Aniya Rowe is a patient of  [facility].   She is a 36 year old  who is 40w4d pregnant with GARETT  Aug 8, 2017, by Patient's last menstrual period was 2016. that is consistent with 6 week US.    [facility].    She reports regular contractions starting at 10 pm, and occurring every 3-5 minutes. She denies fluid leakage. She denies bleeding per vagina. Fetal movement is .normal.    Her prenatal course has been uncomplicated.    Prenatal labs   Lab Results   Component Value Date    ABO O 2017    RH Pos 2017    AS neg 2017    HEPBANG nonreactive 2017    CHPCRT negative 2017    GCPCRT negative 2017       GBS was collected on 17 and was positive.  Weight gain during pregnancy: 29 lbs.     Estimated Fetal Weight documented in record or by US = 7 lb 8 oz on 17           Obstetrical History:   She is a 36 year old   Her OB history:   Obstetric History       T4      L4     SAB1   TAB0   Ectopic0   Multiple0   Live Births4       # Outcome Date GA Lbr Lorenzo/2nd Weight Sex Delivery Anes PTL Lv   6 Current            5 TAB 16 12w4d    TAB      4 Term 04 40w0d   F    NIKKI   3 Term 01 40w0d   M    NIKKI   2 Term 10/23/98 40w0d   M    NIKKI   1 Term 96 40w0d   M    NIKKI                 Immunzations:       There is no immunization history on file for this patient.  Tdap this pregnancy?:YES - Date: 17  Flu shot this pregnancy? yes         Past Medical History:     Past Medical History:   Diagnosis Date     Anemia             Past Surgical History:   History  reviewed. No pertinent surgical history.         Family History:   No family history on file.         Social History:   no tobacco use  no alcohol use  no illicit drug use         Medications:     No current facility-administered medications on file prior to encounter.   No current outpatient prescriptions on file prior to encounter.         Allergies:   Review of patient's allergies indicates no known allergies.         Review of Systems:            Physical Exam:   Vitals:   /78  Temp 98.2  F (36.8  C) (Oral)  Resp 16  LMP 2016  0 lbs 0 oz  There is no height or weight on file to calculate BMI.    GEN: Awake, alert in no apparent distress   ABDOMEN: gravid,  PELVIC:  no fluid noted, no blood noted.  Presenting part:vertex.  Cervix: 2/80%/-1    External Monitor, TOCO: Contractions every 3-5 minutes Tracing is Category 1. No decelerations.      Assessment and Plan:   Assessment:   Aniya Rowe is a 36 year old  at 40w4d in prodromal labor. GBS status is positive.   Patient Active Problem List   Diagnosis     Labor and delivery, indication for care         Plan:   - Admit - see IP orders  Prophylactic antibiotic for + GBS status  Anticipate   - Pain: will discuss when  available  Signed tubal papers. Will locate and confirm that she continues to want this..    Debra Kiser MD

## 2017-08-12 NOTE — PROGRESS NOTES
"Blood pressure 132/75, pulse 55, temperature 98.8  F (37.1  C), temperature source Oral, resp. rate 16, height 1.499 m (4' 11\"), weight 68 kg (150 lb), last menstrual period 2016, unknown if currently breastfeeding.  General appearance: uncomfortable with contractions  Standing breathing through them. Has exercise ball to sit on    CONTACTIONS: Contractions every 2-4 minutes.  Palpate: strong  Pitocin- none,  Antibiotics- PCN  FETAL HEART TONES: baseline 150 with moderate FHR variability and no accelerations.  No decelerations present.    ROM: not ruptured  PELVIC EXAM:PELVIC EXAM: 6/ 95%/ Mid/ soft/ -2 + bloody show    ASSESSMENT:  ==============  IUP @ 40w4d active labor   Fetal Heart rate tracing Category category one  GBS- positive       PLAN:  ===========  comfort measures prn , pt desires unmedicated labor  Anticipate      Tata Tilley,JAVADM, APRN      "

## 2017-08-12 NOTE — PLAN OF CARE
Problem: Goal Outcome Summary  Goal: Goal Outcome Summary  Pt laboring with her  at her side. She requested and got nitrous oxide and is using it appropriately. Ctx q 2-3 minutes, IV abx for + GBS infusing, 's. Expect .

## 2017-08-12 NOTE — IP AVS SNAPSHOT
MRN:[id number]                      After Visit Summary   8/12/2017    Aniya Rowe    MRN: [id number]           Thank you!     Thank you for choosing [facility] for your care. Our goal is always to provide you with excellent care. Hearing back from our patients is one way we can continue to improve our services. Please take a few minutes to complete the written survey that you may receive in the mail after you visit with us. Thank you!        Patient Information     Date Of Birth          1980        Designated Caregiver       Most Recent Value    Caregiver    Will someone help with your care after discharge? no      About your hospital stay     You were admitted on:  August 12, 2017 You last received care in the:  [facility]    You were discharged on:  August 14, 2017       Who to Call     For medical emergencies, please call 911.  For non-urgent questions about your medical care, please call your primary care provider or clinic, [phone]          Attending Provider     Provider Specialty    Debra Kiser MD [facility]    Page, RANI Malin CNM MIDWIFE    Yelena Vargas MD [facility]       Primary Care Provider Office Phone # Fax #    [facility] [phone]      After Care Instructions     Activity       Review discharge instructions            Diet       Resume previous diet            Discharge Instructions - Postpartum visit       Schedule postpartum visit with your provider and return to clinic in 6 weeks.                  Further instructions from your care team       Vaginal Delivery Discharge Instructions: Czech  Actividad:     Pida a los miembros de anderson soto y amigos que la ayuden cuando lo necesite.    No ponga nada en anderson vagina hasta que anderson médico lo permita.    Tómese las próximas semanas con calma para que anderson cuerpo tenga tiempo de recuperarse. En bennie momento puede hacer cualquier actividad que sienta  que puede.    No conduzca si está tomando píldoras para el dolor recetadas por anderson médico. Puede conducir si está tomando píldoras de venta gume para el dolor.    Llame a anderson proveedor de atención médica si tiene alguno de estos síntomas:    Empapa paul toalla femenina con loulou en el correr de 1 hora o ve coágulos más grandes que paul pelota de golf.    Sangrado que dura más de 6 semanas.    Tiene paul secreción vaginal que huele mal.     Fiebre de 100.4  F (38  C) o más (temperatura tomada bajo anderson lengua) con o sin escalofríos     Dolor, calambres o sensibilidad graves en la región inferior de anderson vientre.    Aumento del dolor, hinchazón, enrojecimiento o líquido alrededor de bladimir puntos.    Necesidad más frecuente o urgente de orinar (hacer pis), o ardor al hacerlo.    Enrojecimiento, hinchazón o dolor alrededor de paul vena en anderson pierna.    Problemas para amamantar o un área enrojecida o dolorosa en anderson pecho.    Dolor que aumenta o no se va de paul episiotomía o desgarro en el perineo.    Náuseas y vómitos    Dolor en el pecho y tos o dificultad para respirar.    Problemas para manejar la tristeza, ansiedad o depresión.     Si le preocupa hacerse daño o hacerle daño al bebé, llame al médico de inmediato.     Tiene preguntas o inquietudes después de regresar a casa.    Mantenga bladimir jania limpias:  Lávese siempre las jania antes de tocar el área de anderson perineo y los puntos.  [place] ayuda a reducir anderson riesgo de infección.  Si bladimir jania no están sucias, puede usar un gel de alcohol para limpiarse las jania. Mantenga bladimir uñas cortas y limpias.      Vaginal Delivery Discharge Instructions  Activity:     Ask family and friends for help when you need it.    Do not place anything in your vagina until your doctor approves.    Take it easy for the next few weeks to allow your body to recover. You may do any activities you feel up to at that point.    Do not drive while taking pain pills prescribed by your doctor. You may drive if  taking over-the-counter pain pills.    Call your health care provider if you have any of these symptoms:    You soak a sanitary pad with blood within 1 hour, or you see blood clots larger than a golf ball.    Bleeding that lasts more than 6 weeks.    You have vaginal discharge that smells bad.     A fever of 100.4  F (38  C) or higher (temperature taken under your tongue), with or without chills     Severe, pain, cramping or tenderness in your lower belly area.    Increased pain, swelling, redness or fluid around your stitches.    A more frequent or urgent need to urinate (pee), or it burns when you pee.    Redness, swelling or pain around a vein in your leg.    Problems breastfeeding, or a red or painful area on your breast.    Pain that increases or does not go away from an episiotomy or perineal tear.    Nausea and vomiting.    Chest pain and cough or are gasping for air.    Problems coping with sadness, anxiety, or depression.     If you have any concerns about hurting yourself or the baby, call your doctor right away.     You have questions or concerns after you return home.    Keep your hands clean:  Always wash your hands before touching your perineal area and stitches.  This helps reduce your risk of infection.  If your hands aren t dirty, you may use an alcohol hand-rub to clean your hands. Keep your nails clean and short.    Postpartum Vaginal Delivery Instructions    Activity       Ask family and friends for help when you need it.    Do not place anything in your vagina for 6 weeks.    You are not restricted on other activities, but take it easy for a few weeks to allow your body to recover from delivery.  You are able to do any activities you feel up to that point.    No driving until you have stopped taking your pain medications (usually two weeks after delivery).     Call your health care provider if you have any of these symptoms:       Increased pain, swelling, redness, or fluid around your stiches  "from an episiotomy or perineal tear.    A fever above 100.4 F (38 C) with or without chills when placing a thermometer under your tongue.    You soak a sanitary pad with blood within 1 hour, or you see blood clots larger than a golf ball.    Bleeding that lasts more than 6 weeks.    Vaginal discharge that smells bad.    Severe pain, cramping or tenderness in your lower belly area.    A need to urinate more frequently (use the toilet more often), more urgently (use the toilet very quickly), or it burns when you urinate.    Nausea and vomiting.    Redness, swelling or pain around a vein in your leg.    Problems breastfeeding or a red or painful area on your breast.    Chest pain and cough or are gasping for air.    Problems coping with sadness, anxiety, or depression.  If you have any concerns about hurting yourself or the baby, call your provider immediately.     You have questions or concerns after you return home.     Keep your hands clean:  Always wash your hands before touching your perineal area and stitches.  This helps reduce your risk of infection.  If your hands aren't dirty, you may use an alcohol hand-rub to clean your hands. Keep your nails clean and short.        Pending Results     No orders found from 8/10/2017 to 8/13/2017.            Statement of Approval     Ordered          08/14/17 0819  I have reviewed and agree with all the recommendations and orders detailed in this document.  EFFECTIVE NOW     Approved and electronically signed by:  Yelena Vargas MD             Admission Information     Date & Time Provider Department Dept. Phone    8/12/2017 Yelena Vargas MD [facility] [phone]      Your Vitals Were     Blood Pressure Pulse Temperature Respirations Height Weight    102/62 78 98.4  F (36.9  C) (Oral) 16 1.499 m (4' 11\") 68 kg (150 lb)    Last Period BMI (Body Mass Index)                11/01/2016 30.3 kg/m2          [facility] Information     [facility] lets you send messages to your " "doctor, view your test results, renew your prescriptions, schedule appointments and more. To sign up, go to [email or web address] . Click on \"Log in\" on the left side of the screen, which will take you to the Welcome page. Then click on \"Sign up Now\" on the right side of the page.     You will be asked to enter the access code listed below, as well as some personal information. Please follow the directions to create your username and password.     Your access code is: [email or web address]  Expires: 10/29/2017 11:04 AM     Your access code will  in 90 days. If you need help or a new code, please call your [facility] clinic or [phone].        Care EveryWhere ID     This is your Care EveryWhere ID. This could be used by other organizations to access your [facility] medical records  [id number]        Equal Access to Services     [facility]: [place] Abel, vivek valnecia, sunny fuentes, adam stewart . So [facility] [phone].    ATENCIÓN: Si habla español, tiene a anderson disposición servicios gratuitos de asistencia lingüística. Thu al [phone].    We comply with applicable federal civil rights laws and [place] laws. We do not discriminate on the basis of race, color, national origin, age, disability sex, sexual orientation or gender identity.               Review of your medicines      START taking        Dose / Directions    docusate sodium 100 MG capsule   Commonly known as:  COLACE        Dose:  100 mg   Take 1 capsule (100 mg) by mouth nightly as needed for constipation   Quantity:  100 capsule   Refills:  0       ibuprofen 600 MG tablet   Commonly known as:  ADVIL/MOTRIN        Dose:  600 mg   Take 1 tablet (600 mg) by mouth every 6 hours as needed for moderate pain   Quantity:  60 tablet   Refills:  0         CONTINUE these medicines which have NOT CHANGED        Dose / Directions    prenatal multivitamin plus iron 27-0.8 MG Tabs per tablet "   Indication:  Pregnancy        Dose:  1 tablet   Take 1 tablet by mouth daily   Refills:  0            Where to get your medicines      These medications were sent to [facility] [place], [place] - [address]  [address] [facility], [facility] [address]     Phone:  [phone]     docusate sodium 100 MG capsule    ibuprofen 600 MG tablet                Protect others around you: Learn how to safely use, store and throw away your medicines at www.disposemymeds.org.             Medication List: This is a list of all your medications and when to take them. Check marks below indicate your daily home schedule. Keep this list as a reference.      Medications           Morning Afternoon Evening Bedtime As Needed    docusate sodium 100 MG capsule   Commonly known as:  COLACE   Take 1 capsule (100 mg) by mouth nightly as needed for constipation                                ibuprofen 600 MG tablet   Commonly known as:  ADVIL/MOTRIN   Take 1 tablet (600 mg) by mouth every 6 hours as needed for moderate pain   Last time this was given:  800 mg on 8/13/2017  2:58 PM                                prenatal multivitamin plus iron 27-0.8 MG Tabs per tablet   Take 1 tablet by mouth daily

## 2017-08-13 PROBLEM — O99.019 ANEMIA IN PREGNANCY: Status: ACTIVE | Noted: 2017-06-05

## 2017-08-13 PROBLEM — Z34.90 SUPERVISION OF NORMAL PREGNANCY: Status: ACTIVE | Noted: 2017-01-19

## 2017-08-13 PROBLEM — O09.529 ADVANCED MATERNAL AGE IN MULTIGRAVIDA: Status: ACTIVE | Noted: 2017-01-19

## 2017-08-13 PROBLEM — B95.1 POSITIVE GBS TEST: Status: ACTIVE | Noted: 2017-07-27

## 2017-08-13 LAB — HGB BLD-MCNC: 11.2 G/DL (ref 11.7–15.7)

## 2017-08-13 PROCEDURE — 12000032 ZZH R&B OB CRITICAL UMMC

## 2017-08-13 PROCEDURE — 36415 COLL VENOUS BLD VENIPUNCTURE: CPT | Performed by: ADVANCED PRACTICE MIDWIFE

## 2017-08-13 PROCEDURE — 25000132 ZZH RX MED GY IP 250 OP 250 PS 637: Performed by: ADVANCED PRACTICE MIDWIFE

## 2017-08-13 PROCEDURE — 85018 HEMOGLOBIN: CPT | Performed by: ADVANCED PRACTICE MIDWIFE

## 2017-08-13 RX ORDER — DOCUSATE SODIUM 100 MG/1
100 CAPSULE, LIQUID FILLED ORAL
Qty: 100 CAPSULE | Refills: 0 | Status: SHIPPED | OUTPATIENT
Start: 2017-08-13

## 2017-08-13 RX ORDER — IBUPROFEN 600 MG/1
600 TABLET, FILM COATED ORAL EVERY 6 HOURS PRN
Qty: 60 TABLET | Refills: 0 | Status: SHIPPED | OUTPATIENT
Start: 2017-08-13

## 2017-08-13 RX ADMIN — SENNOSIDES AND DOCUSATE SODIUM 2 TABLET: 8.6; 5 TABLET ORAL at 08:45

## 2017-08-13 RX ADMIN — IBUPROFEN 800 MG: 400 TABLET ORAL at 08:45

## 2017-08-13 RX ADMIN — IBUPROFEN 800 MG: 400 TABLET ORAL at 14:58

## 2017-08-13 NOTE — L&D DELIVERY NOTE
CNM Delivery Note  Labor Course: pt arrived in early labor, admitted, PCN started.   Pain meds: nitrous oxide.  AROM at 9 cm for clear fluid  IUP at 40 weeks gestation delivered on 2017.     delivery of a viable Male infant.  Weight : 8 pounds 5 ounces   Apgars of 6 at 1 minute and 9 at 5 minutes, 9 at 10 min  Labor was spontaneous.  Medications administered  in labor:  Pain Rx N2O; Antibiotics Yes: PCN; Other   Perineum: Minor laceration - No repair  Placenta-mechanism: spontaneous, intact,  with a 3 vessel cord. IV oxytocin was given.  QBLs was 153.  Complications of pregnancy, labor and delivery: None  Anticipated d/c date:   Birth attendants:RANI Shukla,ANJANA      Delivery Summary    Aniya Rowe MRN# [id number]   Age: 36 year old YOB: 1980     ASSESSMENT & PLAN: , routine pp cares        Labor Event Times    Labor onset date:  17 Onset time:   5:00 AM   Dilation complete date:  17 Complete time:   6:22 PM   Start pushing date/time:  2017 1904            Labor Events     labor?:  No    steroids:  None   Labor Type:  Spontaneous, AROM   Predominate monitoring during 1st stage:  continuous electronic fetal monitoring      Antibiotics received during labor?:  Yes   Reason for Antibiotics:  GBS   Antibiotics received for GBS:  Penicillin   Antibiotics Given (GBS):  Greater than 4 hours prior to delivery      Rupture identifier:  Rupture 1   Rupture date/time: 17 1710   Rupture type:  Artificial Rupture of Membranes   Fluid color:  Clear   Fluid odor:  Normal      1:1 continuous labor support provided by?:  RN Labor partogram used?:  no         Delivery/Placenta Date and Time    Delivery Date:  17 Delivery Time:   7:27 PM   Placenta Date/Time:  2017  7:36 PM   Oxytocin given at the time of delivery:  after delivery of baby      Vaginal Counts    Initial count performed by 2 team members:   Two Team Members   JAQUELIN ROSENBAUM  "Tin Tilley cnm          Needles Suture [employer] Sponges Instruments   Initial counts 2 0 5    Added to count 0 0 0    Final counts 2 0 5       Placed during labor Accounted for at the end of labor   NA    NA    NA       Final count performed by 2 team members:   Two Team Members   HENRRY Tai CNM         Final count correct?:  Yes         Apgars    Living status:  Living    1 Minute 5 Minute 10 Minute 15 Minute 20 Minute   Skin color: 1  1  1      Heart rate: 2  2  2      Reflex irritability: 1  2  2      Muscle tone: 1  2  2      Respiratory effort: 1  2  2      Total: 6  9  9         Apgars assigned by:  MATTEO MCKEON RN      Cord    Vessels:  3 Vessels Complications:  Nuchal   Cord Blood Disposition:  Lab Gases Sent?:  Yes         [place] Resuscitation       [place] Care at Delivery:  Male infant born with weak respiratory effort after stimulation. NICU called and cord clamped. While brining infant to warmer, infant began crying. Brought back to mother for skin to skin. Dried, warm blankets and hat applied.   Output in Delivery Room:  Stool      [place] Measurements    Weight:  133 oz Length:  20.5\"   Head circumference:  0.356 m       Skin to Skin and Feeding Plan    Skin to skin initiation date/time:     Skin to skin end date/time:     Breastfeeding initiated date/time:  2017   How do you plan to feed your baby:  Breastfeeding      Labor Events and Shoulder Dystocia    Fetal Tracing Prior to Delivery:  Category 2   Shoulder dystocia present?:  Neg            Delivery (Maternal) (Provider to Complete) (409396)    Episiotomy:  None   Perineal lacerations:  1st Repaired?:  No   Vaginal laceration?:  No    Cervical laceration?:  No    Est. blood loss (mL):  100   Number of repair packets:  0         Mother's Information  Mother: Aniya Rowe #[id number]    Start of Mother's Information     IO Blood Loss  17 0500 - 17 2249    Mom's I/O Activity            End of Mother's " Information  Mother: Aniya Rowe #[id number]            Delivery - Provider to Complete (260700)    Delivering clinician:  DAISY CAMACHO   CNDIANN Care:  Any CNM care in labor   Attempted Delivery Types (Choose all that apply):  Spontaneous Vaginal Delivery   Delivery Type (Choose the 1 that will go to the Birth History):  Vaginal, Spontaneous Delivery                     Other personnel:   Provider Role   GUZMAN MONTERO Delivery Assist            Placenta    Delayed Cord Clamping:  Done   Date/Time:  8/12/2017  7:36 PM   Removal:  Spontaneous   Disposition:  Hospital disposal      Anesthesia    Method:  Nitrous Oxide         Presentation and Position    Presentation:  Vertex   Position:  Right Occiput Posterior                    RANI Shukla CNM

## 2017-08-13 NOTE — PLAN OF CARE
Problem: Labor (Cervical Ripen, Induct, Augment) (Adult,Obstetrics,Pediatric)  Goal: Signs and Symptoms of Listed Potential Problems Will be Absent or Manageable (Labor)  Signs and symptoms of listed potential problems will be absent or manageable by discharge/transition of care (reference Labor (Cervical Ripen, Induct, Augment) (Adult,Obstetrics,Pediatric) CPG).   Outcome: Completed Date Met:  17  Vaginal Delivery Note   of viable Male with ILSA Tilley CNM in attendance.  Nursery RN DIANN Green present.  Infant dried and stimulated.  APGAR at 1 minute:  6 and APGAR at 5 minutes:  9. Apgar at 10 minutes: 9. NICU caled due to weak respiratory effort after stimulation, NICU arrived at 1929, however left after infant spontaneously crying once brought to warmer.  Placenta delivered with out complication. 1st degree laceration without repair, isidro cares provided and ice applied to perineum.  Mother and baby in stable condition.

## 2017-08-13 NOTE — PLAN OF CARE
Mother and baby transferred to postpartum unit room 7121 at 2150 via wheelchair with baby in mother's arms after completion of immediate recovery period. Patient oriented to room and report given to HENRRY Eaton who assumes patient care. Mother and baby bonding well and in stable condition upon transfer. Bands verified with HENRRY Eaton at transfer and found to be matching.

## 2017-08-13 NOTE — PLAN OF CARE
Problem: Goal Outcome Summary  Goal: Goal Outcome Summary  Outcome: Improving  [id number]  Problem: Goal Outcome Summary  Goal: Goal Outcome Summary  [id number]  Data: Vital signs within normal limits. Postpartum checks within normal limits - see flow record. Patient eating and drinking normally. Patient able to empty bladder independently and is up ambulating. No apparent signs of infection.  Patient performing self cares and is able to care for infant.   Action: Patient medicated during the shift for uterine cramping. See MAR. Patient reassessed within 1 hour after each medication and pain was improved - patient stated she was comfortable. Patient education done on breast feeding.   Response: Positive attachment behaviors observed with infant. Support persons present.   Plan: will continue with current plan of care,intervene as needed and notify provider with any change of condition.

## 2017-08-13 NOTE — PLAN OF CARE
Problem: Goal Outcome Summary  Goal: Goal Outcome Summary  Outcome: Improving  Arrived to unit at 2200, stable. Breastfeeding independently on cue. No complaints of pain. Fundus firm and midline, U1. Vss

## 2017-08-13 NOTE — PROGRESS NOTES
[facility] Postpartum Progress Note  2017 7:38 AM  Date of service: 2017.         Interval History:   Aniya Rowe is a 36 year old female s/p  yesterday. Tolerated procedure well and was transferred to post-partum unit for further monitoring. She denies any complaints at this time.    Pain controlled? With OTC meds  Ambulating? minimally  Regular diet? Tolerating   Voiding? yes  Lochia? minimal  Breastfeeding? yes  Contraception? Unknown, was considering tubal ligation vs. Removal yesterday. Still undecided.          Physical Exam:     Vitals:    17 2115 17 2130 17 2217 17 0353   BP: 117/58 111/55 120/65 112/71   Pulse:   72 71   Resp:   18 16   Temp:   99.4  F (37.4  C) 98.3  F (36.8  C)   TempSrc:   Oral Oral   Weight:       Height:           GENERAL:         healthy, alert and no distress  RESPIRATORY:   No increased work of breathing, good air exchange, clear to auscultation bilaterally, no crackles or wheezing   CARDIOVASCULAR:   Normal apical impulse, regular rate and rhythm, normal S1 and S2, no S3 or S4, and no murmur noted   ABDOMEN:   No scars, normal bowel sounds, soft, non-distended, non-tender, no masses palpated, no hepatosplenomegally  Fundal height below level of umbilicus.  Firm and non tender.   EXTREMITIES:          no edema, non-tender extremities         Data:   No results found for: HGB  Lab Results   Component Value Date    RH  Pos 2017            Assessment and Plan:    Aniya Rowe is a 36 year old  PPD#1 s/p ..  L&D complications: GBS+     Patient Active Problem List   Diagnosis     Labor and delivery, indication for care     Normal labor      (normal spontaneous vaginal delivery)       Pain: well controlled with ibuprofen and tylenol  Anemia?No:   Diet: Normal   Encourage ambulation  Baby feeding by breast  Contraception: unsure  Rh +: Rhogam not  indicated  Rubella -: MMR -  Tdap (for pertussis): -  Counseled about: follow up care in clinic  Dispo: Routine post-partum cares, anticipate DC home PPD# 2.    Chay Stewart MD

## 2017-08-13 NOTE — PROGRESS NOTES
[facility] Postpartum Progress Note  2017 1:18 AM  Date of service: 2017.         Interval History:   Aniya Rowe is PPD#1 s/p  at term, considering PPTL    Pain controlled? yes  Ambulating? yes  Regular diet? yes  Voiding? yes  Lochia? minimal  Breastfeeding? Yes, going well  Contraception? Declines BTL for now, will provide with further contraceptive information in Beninese         Physical Exam:     Vitals:    17 2100 17 2115 17 2130 17 2217   BP: 117/55 117/58 111/55 120/65   Pulse:    72   Resp:    18   Temp:    99.4  F (37.4  C)   TempSrc:    Oral   Weight:       Height:           GENERAL:         healthy and no distress  RESPIRATORY:   No increased work of breathing, good air exchange, clear to auscultation bilaterally, no crackles or wheezing   CARDIOVASCULAR:   Regular rate and rhythm, normal S1 and S2, no S3 or S4, and no murmur noted   ABDOMEN:   Normal bowel sounds, soft, non-distended, non-tender, no masses palpated, no hepatosplenomegally  Fundal height at level of umbilicus.  Firm and not tender.   EXTREMITIES:          NO edema, non-tender         Data:     Hemoglobin   Date Value Ref Range Status   2017 11.2 (L) 11.7 - 15.7 g/dL Final     Lab Results   Component Value Date    RH  Pos 2017            Assessment and Plan:    Aniya Rowe is a 36 year old  PPD#1 s/p ..  L&D complications: None     Patient Active Problem List   Diagnosis     Labor and delivery, indication for care     Normal labor      (normal spontaneous vaginal delivery)       Pain: controlled with ibuprofen  Anemia?No: continue PNV after d/c  Diet: Normal   Encourage ambulation  Baby feeding by breast  Contraception: unsure - provided with a list of options in [place]inese  Influenza vaccine: n/a   Rh neg: Rhogam NOT indicated  Rubella Immune: MMR NOT indicated  Tdap (for pertussis):  given  Counseled about: contraception, breastfeeding  Dispo: Routine post-partum cares, anticipate DC home PPD# 2.    Yelena Vargas MD

## 2017-08-14 ENCOUNTER — OFFICE VISIT (OUTPATIENT)
Dept: INTERPRETER SERVICES | Facility: CLINIC | Age: 37
End: 2017-08-14

## 2017-08-14 VITALS
HEART RATE: 78 BPM | SYSTOLIC BLOOD PRESSURE: 102 MMHG | HEIGHT: 59 IN | RESPIRATION RATE: 16 BRPM | BODY MASS INDEX: 30.24 KG/M2 | WEIGHT: 150 LBS | DIASTOLIC BLOOD PRESSURE: 62 MMHG | TEMPERATURE: 98.4 F

## 2017-08-14 PROCEDURE — T1013 SIGN LANG/ORAL INTERPRETER: HCPCS | Mod: U3

## 2017-08-14 NOTE — PLAN OF CARE
Problem: Goal Outcome Summary  Goal: Goal Outcome Summary  Outcome: Improving  No complaints of pain reported. Fundus firm and midline, U1. Breastfeeding independently on cue. Vss

## 2017-08-14 NOTE — PLAN OF CARE
Problem: Goal Outcome Summary  Goal: Goal Outcome Summary  Outcome: Adequate for Discharge Date Met:  08/14/17  Pt discharged to boarding status. D/c instructions & prescriptions given & reviewed with . Pt had no further questions & verbalized understanding her plan. Encouraged to F/U at clinic within 6 weeks for PP check.

## 2017-08-14 NOTE — DISCHARGE INSTRUCTIONS
Vaginal Delivery Discharge Instructions: Sami  Actividad:     Pida a los miembros de anderson soto y amigos que la ayuden cuando lo necesite.    No ponga nada en anderson vagina hasta que anderson médico lo permita.    Tómese las próximas semanas con calma para que anderson cuerpo tenga tiempo de recuperarse. En bennie momento puede hacer cualquier actividad que sienta que puede.    No conduzca si está tomando píldoras para el dolor recetadas por anderson médico. Puede conducir si está tomando píldoras de venta gume para el dolor.    Llame a anderson proveedor de atención médica si tiene alguno de estos síntomas:    Empapa paul toalla femenina con loulou en el correr de 1 hora o ve coágulos más grandes que paul pelota de golf.    Sangrado que dura más de 6 semanas.    Tiene paul secreción vaginal que huele mal.     Fiebre de 100.4  F (38  C) o más (temperatura tomada bajo anderson lengua) con o sin escalofríos     Dolor, calambres o sensibilidad graves en la región inferior de anderson vientre.    Aumento del dolor, hinchazón, enrojecimiento o líquido alrededor de bladimir puntos.    Necesidad más frecuente o urgente de orinar (hacer pis), o ardor al hacerlo.    Enrojecimiento, hinchazón o dolor alrededor de paul vena en anderson pierna.    Problemas para amamantar o un área enrojecida o dolorosa en anderson pecho.    Dolor que aumenta o no se va de paul episiotomía o desgarro en el perineo.    Náuseas y vómitos    Dolor en el pecho y tos o dificultad para respirar.    Problemas para manejar la tristeza, ansiedad o depresión.     Si le preocupa hacerse daño o hacerle daño al bebé, llame al médico de inmediato.     Tiene preguntas o inquietudes después de regresar a casa.    Mantenga bladimir jania limpias:  Lávese siempre las jania antes de tocar el área de anderson perineo y los puntos.  Judson ayuda a reducir anderson riesgo de infección.  Si bladimir jania no están sucias, puede usar un gel de alcohol para limpiarse las jania. Mantenga bladimir uñas cortas y limpias.      Vaginal Delivery Discharge  Instructions  Activity:     Ask family and friends for help when you need it.    Do not place anything in your vagina until your doctor approves.    Take it easy for the next few weeks to allow your body to recover. You may do any activities you feel up to at that point.    Do not drive while taking pain pills prescribed by your doctor. You may drive if taking over-the-counter pain pills.    Call your health care provider if you have any of these symptoms:    You soak a sanitary pad with blood within 1 hour, or you see blood clots larger than a golf ball.    Bleeding that lasts more than 6 weeks.    You have vaginal discharge that smells bad.     A fever of 100.4  F (38  C) or higher (temperature taken under your tongue), with or without chills     Severe, pain, cramping or tenderness in your lower belly area.    Increased pain, swelling, redness or fluid around your stitches.    A more frequent or urgent need to urinate (pee), or it burns when you pee.    Redness, swelling or pain around a vein in your leg.    Problems breastfeeding, or a red or painful area on your breast.    Pain that increases or does not go away from an episiotomy or perineal tear.    Nausea and vomiting.    Chest pain and cough or are gasping for air.    Problems coping with sadness, anxiety, or depression.     If you have any concerns about hurting yourself or the baby, call your doctor right away.     You have questions or concerns after you return home.    Keep your hands clean:  Always wash your hands before touching your perineal area and stitches.  This helps reduce your risk of infection.  If your hands aren t dirty, you may use an alcohol hand-rub to clean your hands. Keep your nails clean and short.    Postpartum Vaginal Delivery Instructions    Activity       Ask family and friends for help when you need it.    Do not place anything in your vagina for 6 weeks.    You are not restricted on other activities, but take it easy for a few  weeks to allow your body to recover from delivery.  You are able to do any activities you feel up to that point.    No driving until you have stopped taking your pain medications (usually two weeks after delivery).     Call your health care provider if you have any of these symptoms:       Increased pain, swelling, redness, or fluid around your stiches from an episiotomy or perineal tear.    A fever above 100.4 F (38 C) with or without chills when placing a thermometer under your tongue.    You soak a sanitary pad with blood within 1 hour, or you see blood clots larger than a golf ball.    Bleeding that lasts more than 6 weeks.    Vaginal discharge that smells bad.    Severe pain, cramping or tenderness in your lower belly area.    A need to urinate more frequently (use the toilet more often), more urgently (use the toilet very quickly), or it burns when you urinate.    Nausea and vomiting.    Redness, swelling or pain around a vein in your leg.    Problems breastfeeding or a red or painful area on your breast.    Chest pain and cough or are gasping for air.    Problems coping with sadness, anxiety, or depression.  If you have any concerns about hurting yourself or the baby, call your provider immediately.     You have questions or concerns after you return home.     Keep your hands clean:  Always wash your hands before touching your perineal area and stitches.  This helps reduce your risk of infection.  If your hands aren't dirty, you may use an alcohol hand-rub to clean your hands. Keep your nails clean and short.

## 2019-10-01 PROBLEM — Z78.9 USES BIRTH CONTROL: Status: ACTIVE | Noted: 2017-07-05
